# Patient Record
Sex: FEMALE | Race: BLACK OR AFRICAN AMERICAN
[De-identification: names, ages, dates, MRNs, and addresses within clinical notes are randomized per-mention and may not be internally consistent; named-entity substitution may affect disease eponyms.]

---

## 2023-10-21 ENCOUNTER — NON-APPOINTMENT (OUTPATIENT)
Age: 78
End: 2023-10-21

## 2023-10-23 ENCOUNTER — APPOINTMENT (OUTPATIENT)
Dept: PULMONOLOGY | Facility: CLINIC | Age: 78
End: 2023-10-23

## 2023-10-23 PROBLEM — Z00.00 ENCOUNTER FOR PREVENTIVE HEALTH EXAMINATION: Status: ACTIVE | Noted: 2023-10-23

## 2023-10-27 ENCOUNTER — APPOINTMENT (OUTPATIENT)
Dept: PULMONOLOGY | Facility: CLINIC | Age: 78
End: 2023-10-27
Payer: MEDICARE

## 2023-10-27 VITALS
DIASTOLIC BLOOD PRESSURE: 82 MMHG | OXYGEN SATURATION: 98 % | HEART RATE: 72 BPM | SYSTOLIC BLOOD PRESSURE: 144 MMHG | WEIGHT: 166 LBS | BODY MASS INDEX: 32.59 KG/M2 | HEIGHT: 60 IN | RESPIRATION RATE: 16 BRPM

## 2023-10-27 DIAGNOSIS — R93.89 ABNORMAL FINDINGS ON DIAGNOSTIC IMAGING OF OTHER SPECIFIED BODY STRUCTURES: ICD-10-CM

## 2023-10-27 PROCEDURE — 99204 OFFICE O/P NEW MOD 45 MIN: CPT

## 2023-10-27 RX ORDER — SIMVASTATIN 80 MG/1
TABLET, FILM COATED ORAL
Refills: 0 | Status: ACTIVE | COMMUNITY

## 2023-10-27 RX ORDER — FAMOTIDINE 20 MG/1
20 TABLET, FILM COATED ORAL DAILY
Qty: 90 | Refills: 3 | Status: ACTIVE | COMMUNITY
Start: 2023-10-27 | End: 1900-01-01

## 2023-10-27 RX ORDER — AMLODIPINE BESYLATE 5 MG/1
TABLET ORAL
Refills: 0 | Status: ACTIVE | COMMUNITY

## 2023-12-07 ENCOUNTER — APPOINTMENT (OUTPATIENT)
Dept: CT IMAGING | Facility: CLINIC | Age: 78
End: 2023-12-07
Payer: MEDICARE

## 2023-12-07 ENCOUNTER — OUTPATIENT (OUTPATIENT)
Dept: OUTPATIENT SERVICES | Facility: HOSPITAL | Age: 78
LOS: 1 days | End: 2023-12-07
Payer: COMMERCIAL

## 2023-12-07 ENCOUNTER — RESULT REVIEW (OUTPATIENT)
Age: 78
End: 2023-12-07

## 2023-12-07 DIAGNOSIS — R05.3 CHRONIC COUGH: ICD-10-CM

## 2023-12-07 PROCEDURE — 71250 CT THORAX DX C-: CPT | Mod: 26

## 2023-12-07 PROCEDURE — 71250 CT THORAX DX C-: CPT

## 2024-01-03 ENCOUNTER — APPOINTMENT (OUTPATIENT)
Dept: PULMONOLOGY | Facility: CLINIC | Age: 79
End: 2024-01-03
Payer: MEDICARE

## 2024-01-03 VITALS — WEIGHT: 161 LBS | HEIGHT: 60 IN | BODY MASS INDEX: 31.61 KG/M2

## 2024-01-03 PROCEDURE — 94010 BREATHING CAPACITY TEST: CPT

## 2024-01-03 PROCEDURE — 85018 HEMOGLOBIN: CPT | Mod: QW

## 2024-01-03 PROCEDURE — 94729 DIFFUSING CAPACITY: CPT

## 2024-01-03 PROCEDURE — 94727 GAS DIL/WSHOT DETER LNG VOL: CPT

## 2024-01-10 ENCOUNTER — NON-APPOINTMENT (OUTPATIENT)
Age: 79
End: 2024-01-10

## 2024-01-10 ENCOUNTER — APPOINTMENT (OUTPATIENT)
Dept: PULMONOLOGY | Facility: CLINIC | Age: 79
End: 2024-01-10
Payer: MEDICARE

## 2024-01-10 VITALS
SYSTOLIC BLOOD PRESSURE: 144 MMHG | WEIGHT: 161 LBS | TEMPERATURE: 97.1 F | OXYGEN SATURATION: 97 % | BODY MASS INDEX: 31.61 KG/M2 | DIASTOLIC BLOOD PRESSURE: 77 MMHG | HEIGHT: 60 IN | HEART RATE: 89 BPM

## 2024-01-10 DIAGNOSIS — R76.12 NONSPECIFIC REACTION TO CELL MEDIATED IMMUNITY MEASUREMENT OF GAMMA INTERFERON ANTIGEN RESPONSE W/OUT ACTIVE TUBERCULOSIS: ICD-10-CM

## 2024-01-10 DIAGNOSIS — R05.3 CHRONIC COUGH: ICD-10-CM

## 2024-01-10 PROCEDURE — 94010 BREATHING CAPACITY TEST: CPT

## 2024-01-10 PROCEDURE — 99215 OFFICE O/P EST HI 40 MIN: CPT | Mod: 25

## 2024-01-10 NOTE — HISTORY OF PRESENT ILLNESS
[TextBox_4] : 77 yo woman brought in with her son. She is from [facility] and speaks Creole mostly. Son is interpreting Comes in fro evaluation of cough for years It is mostly when recumbent but she continues to cough when she sits up Nonsmoker, no evidence of pulmonary disease or history Denies asthma HT on amlodipine, has been on PPIs for reflux in past Most recently evaluated by Dr Echo BORDEN in [place] Had endoscopy--apparently Barretts esophagus [id number] as well as H pylori culture She is just completing a course of antibiotics for H pylori No clear change in cough CT chest Dec 2023 obtained RML 4mm nodule vs intrapulmonary lymophnode Normal appearing lungs NOTE: not addressed by radiologist--a fair amount of intra esophageal  air is seen through length of esophagus   [place] in Jan 24  Normal FVC FEV1 FEV1% but decrease midexpir flow to 50%

## 2024-01-10 NOTE — ASSESSMENT
[FreeTextEntry1] : 79 yo woman brought in with her son. She is from [facility] and speaks Creole mostly. Son is interpreting Comes in for evaluation of cough for years It is mostly when recumbent but she continues to cough when she sits up Nonsmoker, no evidence of pulmonary disease or history Denies asthma HT on amlodipine, has been on PPIs for reflux in past Most recently evaluated by Dr Dodge [facility] in [place] Had endoscopy--apparently Barretts esophagus [id number] as well as H pylori culture She is just completing a course of antibiotics for H pylori No clear change in cough CT chest Dec 2023 obtained RML 4mm nodule vs intrapulmonary lymophnode Normal appearing lungs NOTE: not addressed by radiologist--a fair amount of intra esophageal  air is seen through length of esophagus ++Quantiferon obtain in November 2023   Imp 79 yo woman with HT and persistent longterm cough Nonsmoker with no history of lung disease, normal appearing parenchyma on her CT chest and only mild decrease in meidexpir flow on spirometry Does not appear to be related to airways disease  Note +Quantiferon suggesting latent TB  No evidence of active or previous tuberculosis based upon radiographic criteria Do not recommend treatment for latent TB at this time   Cough is more prominent upon recumbency Note air in the esophagus in this patient qwith previous reflux, recent H pylori and Barretts esophagus Recommend close follow up with GI Dr Dodge as the cough may be related to GI dysfunction.  Will see again as requested